# Patient Record
Sex: MALE | Race: WHITE | NOT HISPANIC OR LATINO
[De-identification: names, ages, dates, MRNs, and addresses within clinical notes are randomized per-mention and may not be internally consistent; named-entity substitution may affect disease eponyms.]

---

## 2023-07-05 ENCOUNTER — TRANSCRIPTION ENCOUNTER (OUTPATIENT)
Age: 63
End: 2023-07-05

## 2023-07-06 ENCOUNTER — APPOINTMENT (OUTPATIENT)
Dept: UROLOGY | Facility: CLINIC | Age: 63
End: 2023-07-06
Payer: COMMERCIAL

## 2023-07-06 VITALS
HEART RATE: 74 BPM | DIASTOLIC BLOOD PRESSURE: 97 MMHG | OXYGEN SATURATION: 97 % | RESPIRATION RATE: 16 BRPM | SYSTOLIC BLOOD PRESSURE: 151 MMHG

## 2023-07-06 PROCEDURE — 99203 OFFICE O/P NEW LOW 30 MIN: CPT

## 2023-07-06 PROCEDURE — 81003 URINALYSIS AUTO W/O SCOPE: CPT | Mod: QW

## 2023-07-10 LAB
BILIRUB UR QL STRIP: NORMAL
CLARITY UR: CLEAR
COLLECTION METHOD: NORMAL
GLUCOSE UR-MCNC: NORMAL
HCG UR QL: 0.2 EU/DL
HGB UR QL STRIP.AUTO: ABNORMAL
KETONES UR-MCNC: NORMAL
LEUKOCYTE ESTERASE UR QL STRIP: NORMAL
NITRITE UR QL STRIP: NORMAL
PH UR STRIP: 5.5
PROT UR STRIP-MCNC: NORMAL
SP GR UR STRIP: >1.03

## 2023-07-10 NOTE — HISTORY OF PRESENT ILLNESS
[FreeTextEntry1] : Hx of elevated PSA followed by Dr Roca at [facility].  States he has a kidney cyst found on testing for this, brings ct report [None] : None

## 2023-07-10 NOTE — ASSESSMENT
[FreeTextEntry1] : Outside ct and u/a  discussed.  Advised to get records from prior prostate evals and f/u with renal ultrasound for cyst

## 2023-07-10 NOTE — PHYSICAL EXAM
[General Appearance - Well Developed] : well developed [General Appearance - Well Nourished] : well nourished [General Appearance - In No Acute Distress] : no acute distress [] : no respiratory distress [FreeTextEntry1] : declined exam [Oriented To Time, Place, And Person] : oriented to person, place, and time

## 2023-07-17 ENCOUNTER — APPOINTMENT (OUTPATIENT)
Dept: ULTRASOUND IMAGING | Facility: CLINIC | Age: 63
End: 2023-07-17
Payer: COMMERCIAL

## 2023-07-17 ENCOUNTER — OUTPATIENT (OUTPATIENT)
Dept: OUTPATIENT SERVICES | Facility: HOSPITAL | Age: 63
LOS: 1 days | End: 2023-07-17
Payer: COMMERCIAL

## 2023-07-17 DIAGNOSIS — N28.1 CYST OF KIDNEY, ACQUIRED: ICD-10-CM

## 2023-07-17 PROCEDURE — 76775 US EXAM ABDO BACK WALL LIM: CPT | Mod: 26

## 2023-07-17 PROCEDURE — 76775 US EXAM ABDO BACK WALL LIM: CPT

## 2023-08-06 ENCOUNTER — NON-APPOINTMENT (OUTPATIENT)
Age: 63
End: 2023-08-06

## 2023-09-05 ENCOUNTER — APPOINTMENT (OUTPATIENT)
Dept: UROLOGY | Facility: CLINIC | Age: 63
End: 2023-09-05
Payer: COMMERCIAL

## 2023-09-05 DIAGNOSIS — R97.20 ELEVATED PROSTATE, SPECIFIC ANTIGEN [PSA]: ICD-10-CM

## 2023-09-05 LAB
BILIRUB UR QL STRIP: ABNORMAL
CLARITY UR: CLEAR
COLLECTION METHOD: NORMAL
GLUCOSE UR-MCNC: NORMAL
HCG UR QL: 0.2 EU/DL
HGB UR QL STRIP.AUTO: NORMAL
KETONES UR-MCNC: NORMAL
LEUKOCYTE ESTERASE UR QL STRIP: NORMAL
NITRITE UR QL STRIP: NORMAL
PH UR STRIP: 5.5
PROT UR STRIP-MCNC: ABNORMAL
SP GR UR STRIP: >1.03

## 2023-09-05 PROCEDURE — 99213 OFFICE O/P EST LOW 20 MIN: CPT

## 2023-09-05 PROCEDURE — 81003 URINALYSIS AUTO W/O SCOPE: CPT | Mod: QW

## 2023-09-22 ENCOUNTER — TRANSCRIPTION ENCOUNTER (OUTPATIENT)
Age: 63
End: 2023-09-22

## 2023-10-07 ENCOUNTER — TRANSCRIPTION ENCOUNTER (OUTPATIENT)
Age: 63
End: 2023-10-07

## 2023-11-04 ENCOUNTER — TRANSCRIPTION ENCOUNTER (OUTPATIENT)
Age: 63
End: 2023-11-04

## 2023-12-09 ENCOUNTER — TRANSCRIPTION ENCOUNTER (OUTPATIENT)
Age: 63
End: 2023-12-09

## 2024-01-13 ENCOUNTER — TRANSCRIPTION ENCOUNTER (OUTPATIENT)
Age: 64
End: 2024-01-13

## 2024-01-29 ENCOUNTER — APPOINTMENT (OUTPATIENT)
Dept: ORTHOPEDIC SURGERY | Facility: CLINIC | Age: 64
End: 2024-01-29
Payer: COMMERCIAL

## 2024-01-29 VITALS — BODY MASS INDEX: 33.59 KG/M2 | HEIGHT: 72 IN | WEIGHT: 248 LBS

## 2024-01-29 DIAGNOSIS — Z82.49 FAMILY HISTORY OF ISCHEMIC HEART DISEASE AND OTHER DISEASES OF THE CIRCULATORY SYSTEM: ICD-10-CM

## 2024-01-29 DIAGNOSIS — M75.42 IMPINGEMENT SYNDROME OF LEFT SHOULDER: ICD-10-CM

## 2024-01-29 DIAGNOSIS — Z00.00 ENCOUNTER FOR GENERAL ADULT MEDICAL EXAMINATION W/OUT ABNORMAL FINDINGS: ICD-10-CM

## 2024-01-29 DIAGNOSIS — I10 ESSENTIAL (PRIMARY) HYPERTENSION: ICD-10-CM

## 2024-01-29 PROCEDURE — 73030 X-RAY EXAM OF SHOULDER: CPT | Mod: LT

## 2024-01-29 PROCEDURE — 72040 X-RAY EXAM NECK SPINE 2-3 VW: CPT

## 2024-01-29 PROCEDURE — 99204 OFFICE O/P NEW MOD 45 MIN: CPT

## 2024-01-29 RX ORDER — EZETIMIBE 10 MG/1
TABLET ORAL
Refills: 0 | Status: ACTIVE | COMMUNITY

## 2024-01-29 RX ORDER — SEMAGLUTIDE 1 MG/.5ML
1 INJECTION, SOLUTION SUBCUTANEOUS
Refills: 0 | Status: ACTIVE | COMMUNITY

## 2024-01-29 RX ORDER — AMLODIPINE BESYLATE 5 MG/1
5 TABLET ORAL
Refills: 0 | Status: ACTIVE | COMMUNITY

## 2024-01-29 RX ORDER — NAPROXEN 500 MG/1
500 TABLET ORAL
Qty: 30 | Refills: 0 | Status: ACTIVE | COMMUNITY
Start: 2024-01-29 | End: 1900-01-01

## 2024-01-29 RX ORDER — ASPIRIN 81 MG
81 TABLET, DELAYED RELEASE (ENTERIC COATED) ORAL
Refills: 0 | Status: ACTIVE | COMMUNITY

## 2024-01-29 RX ORDER — VALSARTAN 40 MG/1
TABLET, COATED ORAL
Refills: 0 | Status: ACTIVE | COMMUNITY

## 2024-01-29 NOTE — PHYSICAL EXAM
[Flexion] : flexion [Extension] : extension [Rotation to left] : rotation to left [Left] : left shoulder [Sitting] : sitting [Mild] : mild [4 ___] : forward flexion 4[unfilled]/5 [4___] : external rotation 4[unfilled]/5 [] : no sensory deficits [TWNoteComboBox7] : active forward flexion 150 degrees

## 2024-01-29 NOTE — ASSESSMENT
[FreeTextEntry1] : Underlying pathology reviewed and treatment options discussed. 01/29/2024: LT shoulder x-rays, 2 views, reveal negative. C-spine x-rays, 2 views, reveal DDD. Obtain MRI c-spine R/O HNP. Obtain MRI left shoulder r/o cuff tear  Start PT and HEP to improve mechanics and reduce pain.  Activity modification as tolerated.  Prescribed EC Naprosyn. I discussed the proper use of this medication as well as potential side effects.  Also discussed Tylenol.  Questions addressed.   The documentation recorded by the scribe accurately reflects the service I personally performed and the decisions made by me. I, Onur Smallibharrison, attest that this documentation has been prepared under the direction and in the presence of Provider Carlos Vazquez MD.   The patient was seen by Carlos Vazquez MD. The following radiographs were ordered and read by me during this patient's visit. I reviewed each radiograph in detail with the patient, and discussed the findings as highlighted below.

## 2024-01-29 NOTE — DISCUSSION/SUMMARY
[de-identified] : The patient was advised of the diagnosis. The natural history of the pathology was explained in full to the patient in layman's terms. The risks and benefits of surgical and non-surgical treatment alternatives were explained in full to the patient. All questions were answered.

## 2024-02-03 ENCOUNTER — APPOINTMENT (OUTPATIENT)
Dept: MRI IMAGING | Facility: CLINIC | Age: 64
End: 2024-02-03
Payer: COMMERCIAL

## 2024-02-03 PROCEDURE — 72141 MRI NECK SPINE W/O DYE: CPT

## 2024-02-03 PROCEDURE — 73221 MRI JOINT UPR EXTREM W/O DYE: CPT | Mod: LT

## 2024-02-05 ENCOUNTER — APPOINTMENT (OUTPATIENT)
Dept: ORTHOPEDIC SURGERY | Facility: CLINIC | Age: 64
End: 2024-02-05
Payer: COMMERCIAL

## 2024-02-05 VITALS — WEIGHT: 248 LBS | BODY MASS INDEX: 33.59 KG/M2 | HEIGHT: 72 IN

## 2024-02-05 PROCEDURE — 20610 DRAIN/INJ JOINT/BURSA W/O US: CPT | Mod: LT

## 2024-02-05 PROCEDURE — 99214 OFFICE O/P EST MOD 30 MIN: CPT | Mod: 25

## 2024-02-05 NOTE — PROCEDURE
[FreeTextEntry3] :  A Large joint injection was performed of the [LEFT SUBACROMIAL SPACE]. The indication for this procedure was pain and inflammation, and patient had decreased mobility in the joint. Risks, benefits and alternatives to a steroid injection procedure were discussed; Risks outlined include but are not limited to infection, sepsis, bleeding, scarring, skin discoloration, temporary increase in pain, syncopal episode, failure to resolve symptoms, allergic reaction, symptom recurrence, and elevation of blood sugar in diabetics.. All questions were answered to the patient's apparent satisfaction and informed consent obtained. Prior to injection a 'Time Out' was conducted in accordance with [facility] policy and the site and nature of procedure verified with the patient.    Procedure: The area of injection was prepared in a sterile fashion. The injection and aspiration was carried out utilizing sterile technique. The site was prepped with alcohol, betadine, ethyl chloride sprayed topically and sterile technique used.   (X) 1cc of Celestone(30mg/ml)  (X) 2cc of 1% Lidocaine   Patient tolerated the procedure well and direct pressure was applied for hemostasis. The patient was reminded of potential post-injection risks including, but not limited to, delayed hypersensitivity reactions and/or infection. Ice tonight to the injection site.

## 2024-02-05 NOTE — DISCUSSION/SUMMARY
[de-identified] : The patient was advised of the diagnosis. The natural history of the pathology was explained in full to the patient in layman's terms. The risks and benefits of surgical and non-surgical treatment alternatives were explained in full to the patient. All questions were answered.

## 2024-02-05 NOTE — ASSESSMENT
[FreeTextEntry1] : Underlying pathology reviewed and treatment options discussed. 01/29/2024: LT shoulder x-rays, 2 views, reveal negative. C-spine x-rays, 2 views, reveal DDD. Obtain MRI c-spine R/O HNP. Obtain MRI left shoulder r/o cuff tear  Start PT and HEP to improve mechanics and reduce pain.  Activity modification as tolerated.  Prescribed EC Naprosyn. I discussed the proper use of this medication as well as potential side effects.  Also discussed Tylenol.  Questions addressed.   02/05/2024: MRI reviewed and discussed.  Based on my independent interpretation of the MRI images there is partial cuff tearing and biceps in the shoulder. 2 weeks ago he abruptly reached for an object and felt instant pain, he likely ruptured his biceps doing this.  We discussed he may eventually need surgery for the partial tearing, but I would not recommend surgery for the biceps.  His trap pain is likely related to the herniated discs seen on the cervical MRI.  Start PT and HEP to improve mechanics and reduce pain.  Discussed and Patient elected to proceed with steroid injection.  Apply ice to affected area.  Activity modification as tolerated.  Questions addressed.   The documentation recorded by the scribe accurately reflects the service I personally performed and the decisions made by me. I, Onur Smallibharrison, attest that this documentation has been prepared under the direction and in the presence of Provider Carlos Vazquez MD.  The patient was seen by Carlos Vazquez MD.

## 2024-02-05 NOTE — DATA REVIEWED
[Left] : left [Shoulder] : shoulder [MRI] : MRI [Cervical Spine] : cervical spine [Report was reviewed and noted in the chart] : The report was reviewed and noted in the chart [I independently reviewed and interpreted images and report] : I independently reviewed and interpreted images and report [FreeTextEntry1] : [facility] MRI LEFT SHOULDER 2/3/24 IMPRESSION: 1. Proximal disruption of the long head of the biceps tendon with retraction into the rotator interval/proximal bicipital groove which may be acute along with circumferential labral tearing with severe surround synovitis surrounding the superior labrum and detachment of the posterior labrum with mild degenerative changes in the posterior glenoid, effusion, synovitis, and capsulitis. 2. Severe partial tearing of the supraspinatus and infraspinatus tendons and mild partial tearing of the subscapularis tendon insertion without tendon retraction with moderate subacromial bursitis, AC joint arthrosis and lateral acromial bone spurs. 3. Moderate disproportionate teres minor muscle atrophy. Signed Will Pickard MD  [facility] MRI C-SPINE 2/3/24 IMPRESSION:  1. Left foraminal herniation with uncovertebral hypertrophy asymmetric on the left impinging the left exiting C4 nerve root with severe left foraminal narrowing at C3-4. 7. Encroachment upon bilateral exiting C5 nerve rots at C4-5, mild central stenosis asymmetric on the right at C6-7, and straightening of the cervical lordosis with multilevel degenerative disc disease without acute fracture or cord compression. Signed Will Barahona MD

## 2024-02-05 NOTE — HISTORY OF PRESENT ILLNESS
[Left Arm] : left arm [Gradual] : gradual [7] : 7 [5] : 5 [Radiating] : radiating [Sleep] : sleep [Rest] : rest [Lying in bed] : lying in bed [Full time] : Work status: full time [de-identified] : 02/05/2024: Left shoulder follow up. He had the MRIs done. Symptoms continue.   01/29/2024: 64 y/o RHD male c/o left shoulder pain that started in 12/2023 after grabbing something. Symptoms improved, but then aggravated after FOOSH last week. Describes anterior shoulder pain that radiates to the left arm. Worse with quick movements. Difficulty sleeping. He has tried Tylenol/Advil without relief. Denies history of prior injury.  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

## 2024-02-24 ENCOUNTER — TRANSCRIPTION ENCOUNTER (OUTPATIENT)
Age: 64
End: 2024-02-24

## 2024-03-04 ENCOUNTER — APPOINTMENT (OUTPATIENT)
Dept: ORTHOPEDIC SURGERY | Facility: CLINIC | Age: 64
End: 2024-03-04
Payer: COMMERCIAL

## 2024-03-04 VITALS — WEIGHT: 248 LBS | BODY MASS INDEX: 33.59 KG/M2 | HEIGHT: 72 IN

## 2024-03-04 PROCEDURE — 99213 OFFICE O/P EST LOW 20 MIN: CPT

## 2024-03-04 NOTE — HISTORY OF PRESENT ILLNESS
[Left Arm] : left arm [Gradual] : gradual [Radiating] : radiating [Sleep] : sleep [Rest] : rest [Lying in bed] : lying in bed [Full time] : Work status: full time [6] : 6 [2] : 2 [Work] : work [Physical therapy] : physical therapy [de-identified] : 03/04/2024: Follow up left shoulder. Moderate relief with CSI at last visit. He has been going to PT with further improvement. Still notes limited external rotation.  02/05/2024: Left shoulder follow up. He had the MRIs done. Symptoms continue.   01/29/2024: 64 y/o RHD male c/o left shoulder pain that started in 12/2023 after grabbing something. Symptoms improved, but then aggravated after FOOSH last week. Describes anterior shoulder pain that radiates to the left arm. Worse with quick movements. Difficulty sleeping. He has tried Tylenol/Advil without relief. Denies history of prior injury.  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

## 2024-03-04 NOTE — PHYSICAL EXAM
[Flexion] : flexion [Extension] : extension [Rotation to left] : rotation to left [Left] : left shoulder [Sitting] : sitting [Mild] : mild [4___] : external rotation 4[unfilled]/5 [] : no sensory deficits [5 ___] : forward flexion 5[unfilled]/5 [TWNoteComboBox7] : active forward flexion 150 degrees

## 2024-03-04 NOTE — ASSESSMENT
[FreeTextEntry1] : Underlying pathology reviewed and treatment options discussed. 01/29/2024: LT shoulder x-rays, 2 views, reveal negative. C-spine x-rays, 2 views, reveal DDD. Obtain MRI c-spine R/O HNP. Obtain MRI left shoulder r/o cuff tear  Start PT and HEP to improve mechanics and reduce pain.  Activity modification as tolerated.  Prescribed EC Naprosyn. I discussed the proper use of this medication as well as potential side effects.  Also discussed Tylenol.  Questions addressed.   02/05/2024: MRI reviewed and discussed.  Based on my independent interpretation of the MRI images there is partial cuff tearing and biceps in the shoulder. 2 weeks ago he abruptly reached for an object and felt instant pain, he likely ruptured his biceps doing this.  We discussed he may eventually need surgery for the partial tearing, but I would not recommend surgery for the biceps.  His trap pain is likely related to the herniated discs seen on the cervical MRI.  Start PT and HEP to improve mechanics and reduce pain.  Discussed and Patient elected to proceed with steroid injection.  Apply ice to affected area.  Activity modification as tolerated.  Questions addressed.   03/04/2024: Patient making progress. Continue PT. Discussed HEP. Return in 6 weeks. Questions answered.  Progress note completed by LIZETTE Leon under the direct supervision of Carlos Vazquez M.D. no

## 2024-03-05 ENCOUNTER — APPOINTMENT (OUTPATIENT)
Dept: UROLOGY | Facility: CLINIC | Age: 64
End: 2024-03-05
Payer: COMMERCIAL

## 2024-03-05 ENCOUNTER — RESULT CHARGE (OUTPATIENT)
Age: 64
End: 2024-03-05

## 2024-03-05 VITALS
SYSTOLIC BLOOD PRESSURE: 161 MMHG | HEART RATE: 78 BPM | DIASTOLIC BLOOD PRESSURE: 92 MMHG | BODY MASS INDEX: 33.59 KG/M2 | WEIGHT: 248 LBS | HEIGHT: 72 IN

## 2024-03-05 DIAGNOSIS — N28.1 CYST OF KIDNEY, ACQUIRED: ICD-10-CM

## 2024-03-05 PROCEDURE — 99213 OFFICE O/P EST LOW 20 MIN: CPT | Mod: 25

## 2024-03-05 PROCEDURE — 81003 URINALYSIS AUTO W/O SCOPE: CPT | Mod: QW

## 2024-03-05 NOTE — HISTORY OF PRESENT ILLNESS
[FreeTextEntry1] : Feels well/voiding well [Dysuria] : no dysuria [Hematuria - Gross] : no gross hematuria [None] : None

## 2024-03-05 NOTE — PHYSICAL EXAM
[General Appearance - Well Nourished] : well nourished [General Appearance - Well Developed] : well developed [General Appearance - In No Acute Distress] : no acute distress [] : no respiratory distress [Testes Tenderness] : no tenderness of the testes [Urethral Meatus] : meatus normal [Testes Mass (___cm)] : there were no testicular masses [Prostate Size ___ (0-4)] : prostate size [unfilled] (scale: 0-4) [Prostate Tenderness] : the prostate was not tender [Oriented To Time, Place, And Person] : oriented to person, place, and time

## 2024-03-05 NOTE — ASSESSMENT
[FreeTextEntry1] : AUASS 7, satisfied, declines treatment at this time, u/a psa and prior ultrasound reviewed, F/u with ultrasoundn advised.

## 2024-03-10 LAB
BILIRUB UR QL STRIP: NORMAL
CLARITY UR: CLEAR
COLLECTION METHOD: NORMAL
GLUCOSE UR-MCNC: NORMAL
HCG UR QL: 1 EU/DL
HGB UR QL STRIP.AUTO: NORMAL
KETONES UR-MCNC: NORMAL
LEUKOCYTE ESTERASE UR QL STRIP: NORMAL
NITRITE UR QL STRIP: NORMAL
PH UR STRIP: 6
PROT UR STRIP-MCNC: NORMAL
PSA FREE FLD-MCNC: 18 %
PSA FREE SERPL-MCNC: 0.45 NG/ML
PSA SERPL-MCNC: 2.43 NG/ML
SP GR UR STRIP: 1.02

## 2024-03-30 ENCOUNTER — TRANSCRIPTION ENCOUNTER (OUTPATIENT)
Age: 64
End: 2024-03-30

## 2024-04-02 ENCOUNTER — TRANSCRIPTION ENCOUNTER (OUTPATIENT)
Age: 64
End: 2024-04-02

## 2024-04-15 ENCOUNTER — APPOINTMENT (OUTPATIENT)
Dept: ORTHOPEDIC SURGERY | Facility: CLINIC | Age: 64
End: 2024-04-15
Payer: COMMERCIAL

## 2024-04-15 VITALS — WEIGHT: 248 LBS | BODY MASS INDEX: 33.59 KG/M2 | HEIGHT: 72 IN

## 2024-04-15 DIAGNOSIS — S46.012D STRAIN OF MUSCLE(S) AND TENDON(S) OF THE ROTATOR CUFF OF LEFT SHOULDER, SUBSEQUENT ENCOUNTER: ICD-10-CM

## 2024-04-15 DIAGNOSIS — M50.90 CERVICAL DISC DISORDER, UNSPECIFIED, UNSPECIFIED CERVICAL REGION: ICD-10-CM

## 2024-04-15 DIAGNOSIS — M50.20 OTHER CERVICAL DISC DISPLACEMENT, UNSPECIFIED CERVICAL REGION: ICD-10-CM

## 2024-04-15 DIAGNOSIS — S46.212D STRAIN OF MUSCLE, FASCIA AND TENDON OF OTHER PARTS OF BICEPS, LEFT ARM, SUBSEQUENT ENCOUNTER: ICD-10-CM

## 2024-04-15 DIAGNOSIS — M54.12 RADICULOPATHY, CERVICAL REGION: ICD-10-CM

## 2024-04-15 PROCEDURE — 99213 OFFICE O/P EST LOW 20 MIN: CPT

## 2024-04-15 NOTE — HISTORY OF PRESENT ILLNESS
[Gradual] : gradual [2] : 2 [Work] : work [Sleep] : sleep [Rest] : rest [Lying in bed] : lying in bed [Full time] : Work status: full time [8] : 8 [Dull/Aching] : dull/aching [Sitting] : sitting [Physical therapy] : physical therapy [de-identified] : 04/15/2024: Follow up today. He has been improving with PT, but still notes lateral shoulder pain and difficulty with ADLs.  03/04/2024: Follow up left shoulder. Moderate relief with CSI at last visit. He has been going to PT with further improvement. Still notes limited external rotation.  02/05/2024: Left shoulder follow up. He had the MRIs done. Symptoms continue.   01/29/2024: 62 y/o RHD male c/o left shoulder pain that started in 12/2023 after grabbing something. Symptoms improved, but then aggravated after FOOSH last week. Describes anterior shoulder pain that radiates to the left arm. Worse with quick movements. Difficulty sleeping. He has tried Tylenol/Advil without relief. Denies history of prior injury.  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

## 2024-04-15 NOTE — ASSESSMENT
[FreeTextEntry1] : Underlying pathology reviewed and treatment options discussed. 01/29/2024: LT shoulder x-rays, 2 views, reveal negative. C-spine x-rays, 2 views, reveal DDD. Obtain MRI c-spine R/O HNP. Obtain MRI left shoulder r/o cuff tear  Start PT and HEP to improve mechanics and reduce pain.  Activity modification as tolerated.  Prescribed EC Naprosyn. I discussed the proper use of this medication as well as potential side effects.  Also discussed Tylenol.  Questions addressed.   02/05/2024: MRI reviewed and discussed.  Based on my independent interpretation of the MRI images there is partial cuff tearing and biceps in the shoulder. 2 weeks ago he abruptly reached for an object and felt instant pain, he likely ruptured his biceps doing this.  We discussed he may eventually need surgery for the partial tearing, but I would not recommend surgery for the biceps.  His trap pain is likely related to the herniated discs seen on the cervical MRI.  Start PT and HEP to improve mechanics and reduce pain.  Discussed and Patient elected to proceed with steroid injection.  Apply ice to affected area.  Activity modification as tolerated.  Questions addressed.   03/04/2024: Patient making progress. Continue PT. Discussed HEP. Return in 6 weeks. Questions answered.  04/15/2024: Treatment options discussed. Reviewed prior MRI with patient. Will continue PT at this time. Consider surgery to repair RTC tear if symptoms persist. Return in 6 weeks. Questions answered.  Progress note completed by LIZETTE Leon under the direct supervision of Carlos Vazquez M.D.  Progress note completed by LIZETTE Leon under the direct supervision of Carlos Vazquez M.D.

## 2024-04-15 NOTE — PHYSICAL EXAM
[Flexion] : flexion [Extension] : extension [Rotation to left] : rotation to left [Left] : left shoulder [Sitting] : sitting [Mild] : mild [5 ___] : forward flexion 5[unfilled]/5 [4___] : external rotation 4[unfilled]/5 [] : no sensory deficits [TWNoteComboBox7] : active forward flexion 150 degrees

## 2024-05-28 ENCOUNTER — TRANSCRIPTION ENCOUNTER (OUTPATIENT)
Age: 64
End: 2024-05-28

## 2024-08-28 ENCOUNTER — APPOINTMENT (OUTPATIENT)
Dept: ULTRASOUND IMAGING | Facility: CLINIC | Age: 64
End: 2024-08-28
Payer: COMMERCIAL

## 2024-08-28 ENCOUNTER — OUTPATIENT (OUTPATIENT)
Dept: OUTPATIENT SERVICES | Facility: HOSPITAL | Age: 64
LOS: 1 days | End: 2024-08-28
Payer: COMMERCIAL

## 2024-08-28 DIAGNOSIS — N28.1 CYST OF KIDNEY, ACQUIRED: ICD-10-CM

## 2024-08-28 PROCEDURE — 76775 US EXAM ABDO BACK WALL LIM: CPT

## 2024-08-28 PROCEDURE — 76775 US EXAM ABDO BACK WALL LIM: CPT | Mod: 26

## 2024-09-03 ENCOUNTER — NON-APPOINTMENT (OUTPATIENT)
Age: 64
End: 2024-09-03

## 2024-09-10 ENCOUNTER — APPOINTMENT (OUTPATIENT)
Dept: UROLOGY | Facility: CLINIC | Age: 64
End: 2024-09-10
Payer: COMMERCIAL

## 2024-09-10 VITALS
SYSTOLIC BLOOD PRESSURE: 158 MMHG | WEIGHT: 248 LBS | BODY MASS INDEX: 33.59 KG/M2 | DIASTOLIC BLOOD PRESSURE: 89 MMHG | HEIGHT: 72 IN

## 2024-09-10 DIAGNOSIS — N52.9 MALE ERECTILE DYSFUNCTION, UNSPECIFIED: ICD-10-CM

## 2024-09-10 DIAGNOSIS — N28.1 CYST OF KIDNEY, ACQUIRED: ICD-10-CM

## 2024-09-10 LAB
BILIRUB UR QL STRIP: NORMAL
CLARITY UR: CLEAR
COLLECTION METHOD: NORMAL
GLUCOSE UR-MCNC: NORMAL
HCG UR QL: 0.2 EU/DL
HGB UR QL STRIP.AUTO: ABNORMAL
KETONES UR-MCNC: ABNORMAL
LEUKOCYTE ESTERASE UR QL STRIP: NORMAL
NITRITE UR QL STRIP: NORMAL
PH UR STRIP: 5.5
PROT UR STRIP-MCNC: NORMAL
SP GR UR STRIP: 1.02

## 2024-09-10 PROCEDURE — 81003 URINALYSIS AUTO W/O SCOPE: CPT | Mod: QW

## 2024-09-10 PROCEDURE — 99213 OFFICE O/P EST LOW 20 MIN: CPT

## 2024-09-10 RX ORDER — SILDENAFIL 20 MG/1
20 TABLET ORAL
Qty: 50 | Refills: 1 | Status: ACTIVE | COMMUNITY
Start: 2024-09-10 | End: 1900-01-01

## 2024-09-11 LAB
APPEARANCE: ABNORMAL
BACTERIA: NEGATIVE /HPF
BILIRUBIN URINE: NEGATIVE
BLOOD URINE: NEGATIVE
CAST: 2 /LPF
COLOR: YELLOW
EPITHELIAL CELLS: 11 /HPF
GLUCOSE QUALITATIVE U: NEGATIVE MG/DL
HYALINE CASTS: PRESENT
KETONES URINE: NEGATIVE MG/DL
LEUKOCYTE ESTERASE URINE: NEGATIVE
MICROSCOPIC-UA: NORMAL
MUCUS: PRESENT
NITRITE URINE: NEGATIVE
PH URINE: 6
PROTEIN URINE: NEGATIVE MG/DL
RED BLOOD CELLS URINE: 3 /HPF
REVIEW: NORMAL
SPECIFIC GRAVITY URINE: 1.01
UROBILINOGEN URINE: 0.2 MG/DL
WHITE BLOOD CELLS URINE: 0 /HPF

## 2024-09-13 ENCOUNTER — TRANSCRIPTION ENCOUNTER (OUTPATIENT)
Age: 64
End: 2024-09-13

## 2024-09-16 NOTE — HISTORY OF PRESENT ILLNESS
[FreeTextEntry1] : Feelsa well/voiding well [Dysuria] : no dysuria [Hematuria - Gross] : no gross hematuria [None] : None

## 2024-10-31 ENCOUNTER — NON-APPOINTMENT (OUTPATIENT)
Age: 64
End: 2024-10-31

## 2024-10-31 ENCOUNTER — APPOINTMENT (OUTPATIENT)
Dept: OTOLARYNGOLOGY | Facility: CLINIC | Age: 64
End: 2024-10-31
Payer: COMMERCIAL

## 2024-10-31 VITALS
DIASTOLIC BLOOD PRESSURE: 84 MMHG | SYSTOLIC BLOOD PRESSURE: 147 MMHG | HEART RATE: 73 BPM | HEIGHT: 72 IN | WEIGHT: 248 LBS | BODY MASS INDEX: 33.59 KG/M2

## 2024-10-31 DIAGNOSIS — H69.93 UNSPECIFIED EUSTACHIAN TUBE DISORDER, BILATERAL: ICD-10-CM

## 2024-10-31 DIAGNOSIS — H93.13 TINNITUS, BILATERAL: ICD-10-CM

## 2024-10-31 DIAGNOSIS — H90.3 SENSORINEURAL HEARING LOSS, BILATERAL: ICD-10-CM

## 2024-10-31 PROCEDURE — 99203 OFFICE O/P NEW LOW 30 MIN: CPT

## 2024-11-11 ENCOUNTER — APPOINTMENT (OUTPATIENT)
Dept: PHARMACY | Facility: CLINIC | Age: 64
End: 2024-11-11
Payer: SELF-PAY

## 2024-11-11 PROCEDURE — V5010 ASSESSMENT FOR HEARING AID: CPT | Mod: NC

## 2024-12-09 ENCOUNTER — APPOINTMENT (OUTPATIENT)
Dept: PHARMACY | Facility: CLINIC | Age: 64
End: 2024-12-09
Payer: SELF-PAY

## 2024-12-09 PROCEDURE — V5261C: CUSTOM

## 2024-12-23 ENCOUNTER — APPOINTMENT (OUTPATIENT)
Dept: PHARMACY | Facility: CLINIC | Age: 64
End: 2024-12-23
Payer: SELF-PAY

## 2024-12-23 PROCEDURE — V5299A: CUSTOM

## 2025-01-06 ENCOUNTER — APPOINTMENT (OUTPATIENT)
Dept: PHARMACY | Facility: CLINIC | Age: 65
End: 2025-01-06
Payer: SELF-PAY

## 2025-01-06 PROCEDURE — V5299A: CUSTOM

## 2025-01-31 ENCOUNTER — TRANSCRIPTION ENCOUNTER (OUTPATIENT)
Age: 65
End: 2025-01-31

## 2025-02-15 LAB
ALBUMIN SERPL ELPH-MCNC: 4.3 G/DL
ALP BLD-CCNC: 78 U/L
ALT SERPL-CCNC: 24 U/L
ANION GAP SERPL CALC-SCNC: 9 MMOL/L
AST SERPL-CCNC: 22 U/L
BILIRUB SERPL-MCNC: 0.6 MG/DL
BUN SERPL-MCNC: 22 MG/DL
CALCIUM SERPL-MCNC: 9.1 MG/DL
CHLORIDE SERPL-SCNC: 108 MMOL/L
CO2 SERPL-SCNC: 25 MMOL/L
CREAT SERPL-MCNC: 0.84 MG/DL
EGFR: 96 ML/MIN/1.73M2
GLUCOSE SERPL-MCNC: 89 MG/DL
HCT VFR BLD CALC: 47.6 %
HGB BLD-MCNC: 15.9 G/DL
MCHC RBC-ENTMCNC: 31.4 PG
MCHC RBC-ENTMCNC: 33.4 G/DL
MCV RBC AUTO: 93.9 FL
PLATELET # BLD AUTO: 188 K/UL
POTASSIUM SERPL-SCNC: 4.4 MMOL/L
PROT SERPL-MCNC: 6.6 G/DL
PSA FREE FLD-MCNC: 22 %
PSA FREE SERPL-MCNC: 0.47 NG/ML
PSA SERPL-MCNC: 2.11 NG/ML
RBC # BLD: 5.07 M/UL
RBC # FLD: 13.6 %
SODIUM SERPL-SCNC: 142 MMOL/L
WBC # FLD AUTO: 8.57 K/UL

## 2025-02-20 ENCOUNTER — APPOINTMENT (OUTPATIENT)
Dept: UROLOGY | Facility: CLINIC | Age: 65
End: 2025-02-20

## 2025-02-20 DIAGNOSIS — R36.1 HEMATOSPERMIA: ICD-10-CM

## 2025-02-20 LAB
APPEARANCE: CLEAR
BILIRUBIN URINE: NEGATIVE
BLOOD URINE: NEGATIVE
COLOR: YELLOW
GLUCOSE QUALITATIVE U: NEGATIVE
KETONES URINE: NEGATIVE
LEUKOCYTE ESTERASE URINE: NEGATIVE
NITRITE URINE: NEGATIVE
PH URINE: 6
PROTEIN URINE: ABNORMAL
SPECIFIC GRAVITY URINE: 1.02
UROBILINOGEN URINE: 0.2 (ref 0.2–?)

## 2025-02-20 PROCEDURE — 81003 URINALYSIS AUTO W/O SCOPE: CPT | Mod: QW

## 2025-02-20 PROCEDURE — 99213 OFFICE O/P EST LOW 20 MIN: CPT

## 2025-03-03 ENCOUNTER — APPOINTMENT (OUTPATIENT)
Dept: CT IMAGING | Facility: CLINIC | Age: 65
End: 2025-03-03
Payer: COMMERCIAL

## 2025-03-03 ENCOUNTER — OUTPATIENT (OUTPATIENT)
Dept: OUTPATIENT SERVICES | Facility: HOSPITAL | Age: 65
LOS: 1 days | End: 2025-03-03
Payer: COMMERCIAL

## 2025-03-03 DIAGNOSIS — R36.1 HEMATOSPERMIA: ICD-10-CM

## 2025-03-03 PROCEDURE — 74178 CT ABD&PLV WO CNTR FLWD CNTR: CPT | Mod: 26

## 2025-03-03 PROCEDURE — 74178 CT ABD&PLV WO CNTR FLWD CNTR: CPT

## 2025-03-13 ENCOUNTER — TRANSCRIPTION ENCOUNTER (OUTPATIENT)
Age: 65
End: 2025-03-13

## 2025-03-19 ENCOUNTER — TRANSCRIPTION ENCOUNTER (OUTPATIENT)
Age: 65
End: 2025-03-19

## 2025-04-03 ENCOUNTER — APPOINTMENT (OUTPATIENT)
Dept: UROLOGY | Facility: CLINIC | Age: 65
End: 2025-04-03

## 2025-04-03 VITALS
SYSTOLIC BLOOD PRESSURE: 157 MMHG | HEIGHT: 72 IN | RESPIRATION RATE: 16 BRPM | DIASTOLIC BLOOD PRESSURE: 90 MMHG | WEIGHT: 292 LBS | HEART RATE: 87 BPM | BODY MASS INDEX: 39.55 KG/M2 | OXYGEN SATURATION: 98 %

## 2025-04-03 DIAGNOSIS — N40.1 BENIGN PROSTATIC HYPERPLASIA WITH LOWER URINARY TRACT SYMPMS: ICD-10-CM

## 2025-04-03 LAB
APPEARANCE: CLEAR
BILIRUBIN URINE: NEGATIVE
BLOOD URINE: NEGATIVE
COLOR: YELLOW
GLUCOSE QUALITATIVE U: NEGATIVE
KETONES URINE: NEGATIVE
LEUKOCYTE ESTERASE URINE: NEGATIVE
NITRITE URINE: NEGATIVE
PH URINE: 6
PROTEIN URINE: 30
SPECIFIC GRAVITY URINE: 1.02
UROBILINOGEN URINE: 1 (ref 0.2–?)

## 2025-04-03 PROCEDURE — 81003 URINALYSIS AUTO W/O SCOPE: CPT | Mod: QW

## 2025-04-03 PROCEDURE — 99213 OFFICE O/P EST LOW 20 MIN: CPT

## 2025-04-03 RX ORDER — ALFUZOSIN HYDROCHLORIDE 10 MG/1
10 TABLET, EXTENDED RELEASE ORAL
Qty: 30 | Refills: 5 | Status: ACTIVE | COMMUNITY
Start: 2025-04-03 | End: 1900-01-01

## 2025-08-04 ENCOUNTER — TRANSCRIPTION ENCOUNTER (OUTPATIENT)
Age: 65
End: 2025-08-04